# Patient Record
Sex: FEMALE | Race: ASIAN | NOT HISPANIC OR LATINO | Employment: STUDENT | URBAN - METROPOLITAN AREA
[De-identification: names, ages, dates, MRNs, and addresses within clinical notes are randomized per-mention and may not be internally consistent; named-entity substitution may affect disease eponyms.]

---

## 2023-09-20 ENCOUNTER — OFFICE VISIT (OUTPATIENT)
Dept: PRIMARY CARE | Facility: CLINIC | Age: 23
End: 2023-09-20
Payer: COMMERCIAL

## 2023-09-20 ENCOUNTER — APPOINTMENT (OUTPATIENT)
Dept: LAB | Facility: LAB | Age: 23
End: 2023-09-20
Payer: COMMERCIAL

## 2023-09-20 ENCOUNTER — LAB (OUTPATIENT)
Dept: LAB | Facility: LAB | Age: 23
End: 2023-09-20
Payer: COMMERCIAL

## 2023-09-20 VITALS
HEIGHT: 67 IN | WEIGHT: 110.6 LBS | TEMPERATURE: 98.2 F | OXYGEN SATURATION: 98 % | HEART RATE: 83 BPM | BODY MASS INDEX: 17.36 KG/M2 | SYSTOLIC BLOOD PRESSURE: 109 MMHG | DIASTOLIC BLOOD PRESSURE: 73 MMHG

## 2023-09-20 DIAGNOSIS — R63.4 WEIGHT LOSS, UNINTENTIONAL: Primary | ICD-10-CM

## 2023-09-20 DIAGNOSIS — R63.4 WEIGHT LOSS, UNINTENTIONAL: ICD-10-CM

## 2023-09-20 LAB
ALANINE AMINOTRANSFERASE (SGPT) (U/L) IN SER/PLAS: 4 U/L (ref 7–45)
ALBUMIN (G/DL) IN SER/PLAS: 5.1 G/DL (ref 3.4–5)
ALKALINE PHOSPHATASE (U/L) IN SER/PLAS: 65 U/L (ref 33–110)
ANION GAP IN SER/PLAS: 16 MMOL/L (ref 10–20)
ASPARTATE AMINOTRANSFERASE (SGOT) (U/L) IN SER/PLAS: 15 U/L (ref 9–39)
BASOPHILS (10*3/UL) IN BLOOD BY AUTOMATED COUNT: 0.03 X10E9/L (ref 0–0.1)
BASOPHILS/100 LEUKOCYTES IN BLOOD BY AUTOMATED COUNT: 0.3 % (ref 0–2)
BILIRUBIN TOTAL (MG/DL) IN SER/PLAS: 0.5 MG/DL (ref 0–1.2)
CALCIUM (MG/DL) IN SER/PLAS: 10.1 MG/DL (ref 8.6–10.6)
CARBON DIOXIDE, TOTAL (MMOL/L) IN SER/PLAS: 25 MMOL/L (ref 21–32)
CHLORIDE (MMOL/L) IN SER/PLAS: 103 MMOL/L (ref 98–107)
CREATININE (MG/DL) IN SER/PLAS: 0.61 MG/DL (ref 0.5–1.05)
EOSINOPHILS (10*3/UL) IN BLOOD BY AUTOMATED COUNT: 0.04 X10E9/L (ref 0–0.7)
EOSINOPHILS/100 LEUKOCYTES IN BLOOD BY AUTOMATED COUNT: 0.4 % (ref 0–6)
ERYTHROCYTE DISTRIBUTION WIDTH (RATIO) BY AUTOMATED COUNT: 11.8 % (ref 11.5–14.5)
ERYTHROCYTE MEAN CORPUSCULAR HEMOGLOBIN CONCENTRATION (G/DL) BY AUTOMATED: 32.4 G/DL (ref 32–36)
ERYTHROCYTE MEAN CORPUSCULAR VOLUME (FL) BY AUTOMATED COUNT: 92 FL (ref 80–100)
ERYTHROCYTES (10*6/UL) IN BLOOD BY AUTOMATED COUNT: 4.84 X10E12/L (ref 4–5.2)
GFR FEMALE: >90 ML/MIN/1.73M2
GLUCOSE (MG/DL) IN SER/PLAS: 73 MG/DL (ref 74–99)
HEMATOCRIT (%) IN BLOOD BY AUTOMATED COUNT: 44.5 % (ref 36–46)
HEMOGLOBIN (G/DL) IN BLOOD: 14.4 G/DL (ref 12–16)
IMMATURE GRANULOCYTES/100 LEUKOCYTES IN BLOOD BY AUTOMATED COUNT: 0.2 % (ref 0–0.9)
LEUKOCYTES (10*3/UL) IN BLOOD BY AUTOMATED COUNT: 8.9 X10E9/L (ref 4.4–11.3)
LYMPHOCYTES (10*3/UL) IN BLOOD BY AUTOMATED COUNT: 1.72 X10E9/L (ref 1.2–4.8)
LYMPHOCYTES/100 LEUKOCYTES IN BLOOD BY AUTOMATED COUNT: 19.3 % (ref 13–44)
MONOCYTES (10*3/UL) IN BLOOD BY AUTOMATED COUNT: 0.35 X10E9/L (ref 0.1–1)
MONOCYTES/100 LEUKOCYTES IN BLOOD BY AUTOMATED COUNT: 3.9 % (ref 2–10)
NEUTROPHILS (10*3/UL) IN BLOOD BY AUTOMATED COUNT: 6.77 X10E9/L (ref 1.2–7.7)
NEUTROPHILS/100 LEUKOCYTES IN BLOOD BY AUTOMATED COUNT: 75.9 % (ref 40–80)
NRBC (PER 100 WBCS) BY AUTOMATED COUNT: 0 /100 WBC (ref 0–0)
PLATELETS (10*3/UL) IN BLOOD AUTOMATED COUNT: 331 X10E9/L (ref 150–450)
POTASSIUM (MMOL/L) IN SER/PLAS: 3.9 MMOL/L (ref 3.5–5.3)
PREALBUMIN (MG/DL) IN SERUM OR PLASMA: 22.6 MG/DL (ref 18–40)
PROTEIN TOTAL: 8.4 G/DL (ref 6.4–8.2)
SODIUM (MMOL/L) IN SER/PLAS: 140 MMOL/L (ref 136–145)
THYROTROPIN (MIU/L) IN SER/PLAS BY DETECTION LIMIT <= 0.05 MIU/L: 0.35 MIU/L (ref 0.44–3.98)
THYROXINE (T4) FREE (NG/DL) IN SER/PLAS: 1.3 NG/DL (ref 0.78–1.48)
UREA NITROGEN (MG/DL) IN SER/PLAS: 21 MG/DL (ref 6–23)

## 2023-09-20 PROCEDURE — 36415 COLL VENOUS BLD VENIPUNCTURE: CPT

## 2023-09-20 PROCEDURE — 80053 COMPREHEN METABOLIC PANEL: CPT

## 2023-09-20 PROCEDURE — 84443 ASSAY THYROID STIM HORMONE: CPT

## 2023-09-20 PROCEDURE — 81001 URINALYSIS AUTO W/SCOPE: CPT

## 2023-09-20 PROCEDURE — 1036F TOBACCO NON-USER: CPT | Performed by: FAMILY MEDICINE

## 2023-09-20 PROCEDURE — 85025 COMPLETE CBC W/AUTO DIFF WBC: CPT

## 2023-09-20 PROCEDURE — 84134 ASSAY OF PREALBUMIN: CPT

## 2023-09-20 PROCEDURE — 84439 ASSAY OF FREE THYROXINE: CPT

## 2023-09-20 PROCEDURE — 99203 OFFICE O/P NEW LOW 30 MIN: CPT | Performed by: FAMILY MEDICINE

## 2023-09-20 ASSESSMENT — PAIN SCALES - GENERAL: PAINLEVEL: 0-NO PAIN

## 2023-09-20 NOTE — PROGRESS NOTES
Ms. Francesco Bhatt is a 23 year old female here to establish care with a new PCP.     #weight loss  -It began when the patient moved to the [place] about 2021 from [place]. She didn't lose weight at first. She lost a lot of weight she thinks because of Covid. She thinks she lost 15-20  pounds in one year and 15-20 in another year.  She used to weigh 140-150 pounds prior to . It is not intentional weight loss. She does not exercise. She not make herself throw up. She eats whatever she wants. She cooks for herself and eats well. She feels ok. Her parents are concerned. Does not eat breakfast. She says she eats a lot of sweets at times. She eats a mix of meats, some carbs and some veggies . ROS negative for fatigue, headaches, chills, fever, night sweats, abdominal pain.  She says she sometimes feels down for weeks at a time, but it does not interfere with her daily activities nor appetite. She also says she has some stress related to her PhD, but that the stress is manageable.     Previous PCP: N/A    OBSTETRIC HISTORY:  G/P:    Abortions:  No    GYNECOLOGICAL HISTORY:  Menarche: Every 30-40 days.  LMP: 23  Intermenstrual bleeding: None  Pelvic pain: Sometimes during menses  Last PAP: None  History of Abnormal PAP: N/A  Pelvic Procedures:  None    PAST MEDICAL HISTORY: None    PAST SURGICAL HISTORY: None    FAMILY HISTORY:  None    SOCIAL HISTORY:  Tobacco:  None  ETOH: None  Drug:  None  Sexual hx: Not sexually active.   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     ALLERGIES: None    RECENT HOSPITALIZATIONS: None    RECENT PROCEDURES: None    PAST PREVENTATIVE CARE: None    REVIEW OF SYSTEMS:   No fatigue, fevers, chills, night sweats ; yes to weight loss  No itching, rashes, lesions  No headaches,  dizziness, vision changes, hearing changes, bleeding gums  No palpitations, chest pain, leg swelling  No cough, SOB, wheezing  No abdominal pain, nausea, vomiting, appetite changes  No urinary  "urgency, dysuria, urinary frequency  No melena, hematuria  No bleeding, bruising             9/20/2023     3:02 PM   Vitals   Systolic 109   Diastolic 73   Heart Rate 83   Temp 36.8 °C (98.2 °F)   Height (in) 1.69 m (5' 6.54\")   Weight (lb) 110.6   BMI 17.57 kg/m2   BSA (m2) 1.54 m2   Visit Report Report      PHYSICAL EXAM:  -General: Well developed. Alert. No acute distress.  -Skin: Warm, dry. normal skin turgor. no rashes. no lesions.  -HEENT: NCAT, moist mucosa, no erythema. EOMI. Ear canal patent with TMs intact bilaterally. No abnormality on thyroid exam.  -Cardiovascular: Regular rate and rhythm, normal S1 and S2, no gallops, no murmurs and no pericardial rub.  -Pulmonary: Clear bilateral breath sounds. no crackles, rales, rhonchi. good chest rise B/L. speaks in full sentences.  -Abdomen: (+) BS. Soft, non-tender, non-distended, no abdominal masses. No guarding or rigidity.  -Neurologic: Sensation equal b/l and grossly intact.  -Musculoskeletal: Normal gait. Normal Stance. full range of motion in all extremities. Strength 5/5 in all extremities.  -Psychiatric: Good judgment. Good insight. Alert and oriented x3 (place, person, time). Normal mood. Normal affect.          Assessment:  24 y/o F with no PMHx presents to establish care with PCP. She also presents with gradual, un-intentional weight loss over the past 2 years of approximately 30-40 pounds.     Plan:   Diagnoses and all orders for this visit:  Weight loss, unintentional; BMI 17.57  -     Ordered TSH With Reflex To Free T4 If Abnormal; Future  -     Ordered CBC and Auto Differential; Future  -     Ordered Comprehensive metabolic panel; Future  -     Ordered Prealbumin; Future  -     Ordered  Urinalysis with Reflex Microscopic; Future  -     Ordered Urinalysis Microscopic Only; Future  -     Encouraged to increase the amount of carbohydrates each day such as cereal, rice, pasta, bread, etc.   Other orders  -     Follow Up In Primary Care - Established; " Future 4-6 weeks for follow up lab results and weight      Pt seen and d/w  Dr. Sanderson.     Lorraine Murray MD   Family Medicine, PGY1  [facility]

## 2023-09-21 LAB
BACTERIA, URINE: ABNORMAL /HPF
MUCUS, URINE: ABNORMAL /LPF
RBC, URINE: 2 /HPF (ref 0–5)
SQUAMOUS EPITHELIAL CELLS, URINE: 1 /HPF
WBC, URINE: 1 /HPF (ref 0–5)

## 2023-09-22 NOTE — PROGRESS NOTES
I saw and evaluated the patient. I personally obtained the key and critical portions of the history and physical exam or was physically present for key and critical portions performed by the resident/fellow. I reviewed the resident/fellow's documentation and discussed the patient with the resident/fellow. I agree with the resident/fellow's medical decision making as documented in the note.    Kaitlin Sanderson MD

## 2023-10-27 ENCOUNTER — APPOINTMENT (OUTPATIENT)
Dept: PRIMARY CARE | Facility: CLINIC | Age: 23
End: 2023-10-27
Payer: COMMERCIAL

## 2023-11-30 ENCOUNTER — OFFICE VISIT (OUTPATIENT)
Dept: OPHTHALMOLOGY | Facility: CLINIC | Age: 23
End: 2023-11-30
Payer: COMMERCIAL

## 2023-11-30 DIAGNOSIS — H52.13 MYOPIA OF BOTH EYES: Primary | ICD-10-CM

## 2023-11-30 ASSESSMENT — SLIT LAMP EXAM - LIDS
COMMENTS: NORMAL
COMMENTS: NORMAL

## 2023-11-30 ASSESSMENT — CONF VISUAL FIELD
OS_NORMAL: 1
OS_INFERIOR_TEMPORAL_RESTRICTION: 0
OD_SUPERIOR_NASAL_RESTRICTION: 0
OS_INFERIOR_NASAL_RESTRICTION: 0
OS_SUPERIOR_NASAL_RESTRICTION: 0
OD_SUPERIOR_TEMPORAL_RESTRICTION: 0
OD_INFERIOR_NASAL_RESTRICTION: 0
OS_SUPERIOR_TEMPORAL_RESTRICTION: 0
OD_INFERIOR_TEMPORAL_RESTRICTION: 0
OD_NORMAL: 1

## 2023-11-30 ASSESSMENT — VISUAL ACUITY
OS_SC: 20/30
OD_SC: 20/20
OS_SC+: +2
METHOD: SNELLEN - LINEAR

## 2023-11-30 ASSESSMENT — REFRACTION_CURRENTRX
OS_SPHERE: +0.75
OS_DIAMETER: 10.8
OD_DIAMETER: 10.8
OD_BASECURVE: 9.18
OS_BASECURVE: 9.06
OD_SPHERE: +0.75

## 2023-11-30 ASSESSMENT — EXTERNAL EXAM - RIGHT EYE: OD_EXAM: NORMAL

## 2023-11-30 ASSESSMENT — EXTERNAL EXAM - LEFT EYE: OS_EXAM: NORMAL

## 2023-11-30 NOTE — PROGRESS NOTES
Assessment/Plan   Diagnoses and all orders for this visit:  Myopia of both eyes    Dispense new ortho K lenses    Follow up 2 weeks MELISSA and visual acuity (VA)

## 2023-12-19 ENCOUNTER — APPOINTMENT (OUTPATIENT)
Dept: PRIMARY CARE | Facility: CLINIC | Age: 23
End: 2023-12-19
Payer: COMMERCIAL

## 2023-12-19 ENCOUNTER — OFFICE VISIT (OUTPATIENT)
Dept: OPHTHALMOLOGY | Facility: CLINIC | Age: 23
End: 2023-12-19
Payer: COMMERCIAL

## 2023-12-19 DIAGNOSIS — H52.13 MYOPIA OF BOTH EYES: Primary | ICD-10-CM

## 2023-12-19 ASSESSMENT — ENCOUNTER SYMPTOMS
GASTROINTESTINAL NEGATIVE: 0
MUSCULOSKELETAL NEGATIVE: 0
NEUROLOGICAL NEGATIVE: 0
CARDIOVASCULAR NEGATIVE: 0
ENDOCRINE NEGATIVE: 0
PSYCHIATRIC NEGATIVE: 0
RESPIRATORY NEGATIVE: 0
EYES NEGATIVE: 0
ALLERGIC/IMMUNOLOGIC NEGATIVE: 0
HEMATOLOGIC/LYMPHATIC NEGATIVE: 0
CONSTITUTIONAL NEGATIVE: 0

## 2023-12-19 ASSESSMENT — REFRACTION_MANIFEST
OS_AXIS: 170
OS_CYLINDER: -5.50
OD_AXIS: 150
OD_CYLINDER: -3.00
OS_SPHERE: -0.50
METHOD_AUTOREFRACTION: 1
OD_SPHERE: +0.25

## 2023-12-19 ASSESSMENT — SLIT LAMP EXAM - LIDS
COMMENTS: NORMAL
COMMENTS: NORMAL

## 2023-12-19 ASSESSMENT — VISUAL ACUITY
OD_SC: 20/30
OD_SC+: -2
OS_SC: 20/40
METHOD: SNELLEN - LINEAR

## 2023-12-19 ASSESSMENT — EXTERNAL EXAM - RIGHT EYE: OD_EXAM: NORMAL

## 2023-12-19 ASSESSMENT — EXTERNAL EXAM - LEFT EYE: OS_EXAM: NORMAL

## 2023-12-19 NOTE — Clinical Note
Igor SHARMA, I am seeking your advice on a euclid ortho K fit; original euclid lenses from [place] with inferior decentration and I have not been able to improve fit, her habitual lenses are best but not ideal  Any ideas?  I mentioned a wash out might be best next approach and then refit

## 2023-12-20 NOTE — PROGRESS NOTES
Assessment/Plan   Diagnoses and all orders for this visit:  Myopia of both eyes  -     Corneal Topography - OU - Both Eyes    Ortho K patient still under initial refitting fee with [place] lenses originally obtained in [place], cannot adjust fit to center lens and optic zone which is displaced inferiorly Both eyes    Most recent CL Left eye has less myopic flattening and same inferior displacement as habitual lenses    Consult with Dr Rojas on possible refit or wash out to get baseline corneal shape